# Patient Record
Sex: MALE | Race: WHITE
[De-identification: names, ages, dates, MRNs, and addresses within clinical notes are randomized per-mention and may not be internally consistent; named-entity substitution may affect disease eponyms.]

---

## 2021-08-23 ENCOUNTER — HOSPITAL ENCOUNTER (EMERGENCY)
Dept: HOSPITAL 63 - ER | Age: 51
Discharge: HOME | End: 2021-08-23
Payer: COMMERCIAL

## 2021-08-23 ENCOUNTER — HOSPITAL ENCOUNTER (OUTPATIENT)
Dept: HOSPITAL 63 - RAD | Age: 51
End: 2021-08-23
Attending: NURSE PRACTITIONER
Payer: COMMERCIAL

## 2021-08-23 VITALS — WEIGHT: 238.1 LBS | HEIGHT: 68 IN | BODY MASS INDEX: 36.09 KG/M2

## 2021-08-23 VITALS — DIASTOLIC BLOOD PRESSURE: 59 MMHG | SYSTOLIC BLOOD PRESSURE: 153 MMHG
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
## 2021-08-23 DIAGNOSIS — S62.634B: Primary | ICD-10-CM

## 2021-08-23 PROCEDURE — 73130 X-RAY EXAM OF HAND: CPT

## 2021-08-23 NOTE — PHYS DOC
Past History


Past Surgical History:  No Surgical History





Adult General


Chief Complaint


Chief Complaint:  FINGER INJURY





HPI


HPI





Patient is a 51-year-old male presenting for finger accident.  Reports he was at

work, reports having a heavy 1 inch plate dropped on his right ring finger.  

Reported immediate focal pain that was nonradiating to distal tip of right ring 

finger mildly involving nailbed.  No changes in motor or sensory or neuro 

function.  Presented to local urgent care and had an x-ray performed that was 

concerning for an open distal tuft fracture prompting urgent care to refer 

patient to our ER for evaluation.  On evaluation, patient reports a dull ache at

site of injury without any radiation or concerning signs or symptoms.  Admits he

is otherwise healthy with no known medical issues.  Tetanus is up-to-date





Review of Systems


Review of Systems


Fourteen body systems of review of systems have been reviewed. See HPI for 

pertinent positives and negative responses, other wise all other systems are 

negative, non-pertinent or non-contributory





Physical Exam


Physical Exam


Constitutional: Well developed, well nourished, no acute distress, non-toxic 

appearance. 


HENT: Normocephalic, atraumatic, bilateral external ears normal, oropharynx 

moist, no oral exudates, nose normal. 


Eyes: PERRLA, EOMI, conjunctiva normal, no discharge.  


Neck: Normal range of motion, no tenderness, supple, no stridor.  


Cardiovascular: Heart rate regular per monitor


Lungs & Thorax: No respiratory distress or accessory muscle use, bilateral chest

rise


Abdomen: Abdomen soft, non-tender, bowel sounds present in all quadrants, no 

guarding or rebound, nonacute abdomen.


Skin: Warm, dry, no erythema, no rash.  


Back: No tenderness, no CVA tenderness.  


Extremities: Tenderness present to distal tip of right ring finger with minimal 

nail matrix involvement, there is an area of concern that is open with minute 

amount of visible bone and subcutaneous tissue present, no cyanosis, no 

clubbing, ROM intact, no edema.  2+ radial pulses bilaterally to upper 

extremities


Neurologic: Alert and oriented X 3, medial radial and ulnar nerves intact, 

normal motor & sensory function, no focal deficits noted. 


Psychologic: Affect normal, judgement normal, mood normal.





Current Patient Data


Vital Signs





                                   Vital Signs








  Date Time  Temp Pulse Resp B/P (MAP) Pulse Ox O2 Delivery O2 Flow Rate FiO2


 


8/23/21 11:43 98.7 73 18 153/59 97 Room Air  











EKG


EKG


[]





Radiology/Procedures


Radiology/Procedures





XR HAND_RIGHT 3 VIEWS





History: Dropped a saw plate onto hand this morning.





Comparison: None.





Technique: 3 views of the right hand.





Findings:


Osseous mineralization is normal. There is a mildly comminuted tuft fracture 

ring finger distal phalanx. Mild degenerative changes of the interphalangeal 

metacarpophalangeal joints. Soft tissue irregularity at the tip of the right 

ring finger consistent with laceration.





Impression: 


1.  Open, mildly comminuted tuft fracture right ring finger distal phalanx tip.





Electronically signed by: Eddie Antonio MD (8/23/2021 10:53 AM) [id number]





Heart Score


C/O Chest Pain:  No


Risk Factors:


Risk Factors:  DM, Current or recent (<one month) smoker, HTN, HLP, family 

history of CAD, obesity.


Risk Scores:


Risk Factors:  DM, Current or recent (<one month) smoker, HTN, HLP, family 

history of CAD, obesity.





Course & Med Decision Making


Course & Med Decision Making


ABCs unremarkable. I disclosed entirety of ER findings and discussed most likely

 diagnosis of an open tuft fracture.  Hand surgery at [facility] contacted and I spoke

 with attending physician.  They reviewed patient case and recommended patient 

be splinted, and to be provided antibiotics specifically Augmentin with close 

outpatient follow-up in their clinic.  I disclose conversation with patient and 

discussed there is no need for emergent transfer for immediate surgical 

indication.  As such, plan of care discussed at length with need for close 

outpatient follow-up to review today's ER visit stressed. Strict return 

precautions were also discussed at length with good understanding by patient. 

Patient voiced understanding and agreement with the plan. Patient knows to come 

back for repeat evaluation if concerning signs or symptoms present prior to 

outpatient follow-up. Hemodynamically stable, ambulatory and well-appearing at 

time of disposition.





Dragon Disclaimer


Dragon Disclaimer


This electronic medical record was generated, in whole or in part, using a voice

 recognition dictation system.





Departure


Departure:


Impression:  


   Primary Impression:  


   Open fracture of tuft of distal phalanx of finger


Disposition:  01 HOME / SELF CARE / HOMELESS


Condition:  STABLE


Referrals:  


PCP,NO (PCP)


Patient Instructions:  Finger Fracture





Additional Instructions:  


You were seen for a fracture or broken bone.  We spoke with the orthopedic 

doctors who need to see you in the [facility] orthopedic clinic.  They plan to see you

 this upcoming Wednesday.  It is imperative that you contact our office at 

[phone] immediately after ER departure to schedule your outpatient follow-up.

 You should not use the affected body part until you follow up with orthopedics.

  Keep the area clean, dry, and avoid getting it wet.  You should use ice, 

NSAIDs, Tylenol, provided pain medication and elevation to help with swelling 

and pain.  Return to the ED if you develop worsening pain, numbness, tingling, 

weakness, fever, redness, or any other new or concerning symptoms.


Scripts


Amoxicillin/Potassium Clav (AUGMENTIN 875-125 TABLET) 1 Each Tablet


1 TAB PO BID for Finger Infection for 7 Days, #13 TAB 0 Refills


   Prov: HELEN ENGEL DO         8/23/21 


Hydrocodone Bit/Acetaminophen (HYDROCODONE-APAP 5-325  **) 1 Each Tablet


1 TAB PO PRN Q6HRS PRN for PAIN, #10 TAB 0 Refills


   Prov: HELEN ENGEL DO         8/23/21











HELEN ENGEL DO                 Aug 23, 2021 12:01

## 2021-08-23 NOTE — RAD
XR HAND_RIGHT 3 VIEWS



History: Dropped a saw plate onto hand this morning.



Comparison: None.



Technique: 3 views of the right hand.



Findings:

Osseous mineralization is normal. There is a mildly comminuted tuft fracture ring finger distal phala
nx. Mild degenerative changes of the interphalangeal metacarpophalangeal joints. Soft tissue irregula
rity at the tip of the right ring finger consistent with laceration.



Impression: 

1.  Open, mildly comminuted tuft fracture right ring finger distal phalanx tip.



Electronically signed by: Eddie Antonio MD (8/23/2021 10:53 AM) [id number]